# Patient Record
Sex: FEMALE | Race: WHITE | Employment: FULL TIME | URBAN - METROPOLITAN AREA
[De-identification: names, ages, dates, MRNs, and addresses within clinical notes are randomized per-mention and may not be internally consistent; named-entity substitution may affect disease eponyms.]

---

## 2021-05-17 RX ORDER — CEFAZOLIN SODIUM/WATER 2 G/20 ML
2 SYRINGE (ML) INTRAVENOUS ONCE
Status: CANCELLED | OUTPATIENT
Start: 2021-05-17 | End: 2021-05-17

## 2021-05-19 ENCOUNTER — ANESTHESIA EVENT (OUTPATIENT)
Dept: SURGERY | Age: 51
End: 2021-05-19
Payer: COMMERCIAL

## 2021-05-20 ENCOUNTER — HOSPITAL ENCOUNTER (OUTPATIENT)
Age: 51
Setting detail: OUTPATIENT SURGERY
Discharge: HOME | End: 2021-05-20
Attending: ORTHOPAEDIC SURGERY | Admitting: ORTHOPAEDIC SURGERY
Payer: COMMERCIAL

## 2021-05-20 ENCOUNTER — ANESTHESIA (OUTPATIENT)
Dept: SURGERY | Age: 51
End: 2021-05-20
Payer: COMMERCIAL

## 2021-05-20 VITALS
RESPIRATION RATE: 18 BRPM | TEMPERATURE: 98.2 F | WEIGHT: 288 LBS | SYSTOLIC BLOOD PRESSURE: 109 MMHG | HEART RATE: 58 BPM | BODY MASS INDEX: 45.11 KG/M2 | DIASTOLIC BLOOD PRESSURE: 62 MMHG | OXYGEN SATURATION: 96 %

## 2021-05-20 DIAGNOSIS — G89.18 ACUTE POST-OPERATIVE PAIN: ICD-10-CM

## 2021-05-20 DIAGNOSIS — M72.2 PLANTAR FASCIITIS OF LEFT FOOT: Primary | ICD-10-CM

## 2021-05-20 LAB — POTASSIUM BLD-SCNC: 3.7 MMOL/L (ref 3.5–5.1)

## 2021-05-20 PROCEDURE — 76210000020 HC REC RM PH II FIRST 0.5 HR: Performed by: ORTHOPAEDIC SURGERY

## 2021-05-20 PROCEDURE — 77030002982 HC SUT POLYSRB J&J -A: Performed by: ORTHOPAEDIC SURGERY

## 2021-05-20 PROCEDURE — 74011250636 HC RX REV CODE- 250/636: Performed by: ANESTHESIOLOGY

## 2021-05-20 PROCEDURE — 74011000250 HC RX REV CODE- 250: Performed by: NURSE ANESTHETIST, CERTIFIED REGISTERED

## 2021-05-20 PROCEDURE — 84132 ASSAY OF SERUM POTASSIUM: CPT

## 2021-05-20 PROCEDURE — 74011250636 HC RX REV CODE- 250/636

## 2021-05-20 PROCEDURE — 77030037088 HC TUBE ENDOTRACH ORAL NSL COVD-A: Performed by: ANESTHESIOLOGY

## 2021-05-20 PROCEDURE — 77030025281 HC SPLNT ORTHGLS 1 BSNM -B: Performed by: ORTHOPAEDIC SURGERY

## 2021-05-20 PROCEDURE — 28120 PART REMOVAL OF ANKLE/HEEL: CPT | Performed by: ORTHOPAEDIC SURGERY

## 2021-05-20 PROCEDURE — 77030002933 HC SUT MCRYL J&J -A: Performed by: ORTHOPAEDIC SURGERY

## 2021-05-20 PROCEDURE — 77030003602 HC NDL NRV BLK BBMI -B: Performed by: ANESTHESIOLOGY

## 2021-05-20 PROCEDURE — 77030013921: Performed by: ORTHOPAEDIC SURGERY

## 2021-05-20 PROCEDURE — 28060 PARTIAL REMOVAL FOOT FASCIA: CPT | Performed by: ORTHOPAEDIC SURGERY

## 2021-05-20 PROCEDURE — 74011250636 HC RX REV CODE- 250/636: Performed by: ORTHOPAEDIC SURGERY

## 2021-05-20 PROCEDURE — 77030036649: Performed by: ORTHOPAEDIC SURGERY

## 2021-05-20 PROCEDURE — 76010000161 HC OR TIME 1 TO 1.5 HR INTENSV-TIER 1: Performed by: ORTHOPAEDIC SURGERY

## 2021-05-20 PROCEDURE — 2709999900 HC NON-CHARGEABLE SUPPLY: Performed by: ORTHOPAEDIC SURGERY

## 2021-05-20 PROCEDURE — 77030039425 HC BLD LARYNG TRULITE DISP TELE -A: Performed by: ANESTHESIOLOGY

## 2021-05-20 PROCEDURE — 76942 ECHO GUIDE FOR BIOPSY: CPT | Performed by: ORTHOPAEDIC SURGERY

## 2021-05-20 PROCEDURE — C1713 ANCHOR/SCREW BN/BN,TIS/BN: HCPCS | Performed by: ORTHOPAEDIC SURGERY

## 2021-05-20 PROCEDURE — 76010010054 HC POST OP PAIN BLOCK: Performed by: ORTHOPAEDIC SURGERY

## 2021-05-20 PROCEDURE — 76210000063 HC OR PH I REC FIRST 0.5 HR: Performed by: ORTHOPAEDIC SURGERY

## 2021-05-20 PROCEDURE — 27691 REVISE LOWER LEG TENDON: CPT | Performed by: ORTHOPAEDIC SURGERY

## 2021-05-20 PROCEDURE — 76060000033 HC ANESTHESIA 1 TO 1.5 HR: Performed by: ORTHOPAEDIC SURGERY

## 2021-05-20 PROCEDURE — 77030000032 HC CUF TRNQT ZIMM -B: Performed by: ORTHOPAEDIC SURGERY

## 2021-05-20 PROCEDURE — 77030020274 HC MISC IMPL ORTHOPEDIC: Performed by: ORTHOPAEDIC SURGERY

## 2021-05-20 PROCEDURE — 28119 REMOVAL OF HEEL SPUR: CPT | Performed by: ORTHOPAEDIC SURGERY

## 2021-05-20 PROCEDURE — 77030013789 HC KT REP BIO TEND ARTH -C: Performed by: ORTHOPAEDIC SURGERY

## 2021-05-20 PROCEDURE — 27654 REPAIR OF ACHILLES TENDON: CPT | Performed by: ORTHOPAEDIC SURGERY

## 2021-05-20 PROCEDURE — 76010010054 HC POST OP PAIN BLOCK

## 2021-05-20 PROCEDURE — 74011250636 HC RX REV CODE- 250/636: Performed by: NURSE ANESTHETIST, CERTIFIED REGISTERED

## 2021-05-20 DEVICE — TWINFIX ULTRA 4.5 MM POLY L LACTIC                                    ACID-HA SUTURE ANCHOR WITH TWO NO.2                                    ULTRABRAID SUTURES BLUE,                                    BLUE-COBRAID WITH NEEDLES
Type: IMPLANTABLE DEVICE | Site: FOOT | Status: FUNCTIONAL
Brand: TWINFIX

## 2021-05-20 DEVICE — SCREW INTFR L15MM DIA6.25MM BIOCOMPOSITE FACILITATE IORT: Type: IMPLANTABLE DEVICE | Site: FOOT | Status: FUNCTIONAL

## 2021-05-20 DEVICE — HEALICOIL KNOTLESS PK  NST
Type: IMPLANTABLE DEVICE | Site: FOOT | Status: FUNCTIONAL
Brand: HEALICOIL

## 2021-05-20 RX ORDER — SODIUM CHLORIDE, SODIUM LACTATE, POTASSIUM CHLORIDE, CALCIUM CHLORIDE 600; 310; 30; 20 MG/100ML; MG/100ML; MG/100ML; MG/100ML
75 INJECTION, SOLUTION INTRAVENOUS CONTINUOUS
Status: DISCONTINUED | OUTPATIENT
Start: 2021-05-20 | End: 2021-05-20 | Stop reason: HOSPADM

## 2021-05-20 RX ORDER — FENTANYL CITRATE 50 UG/ML
100 INJECTION, SOLUTION INTRAMUSCULAR; INTRAVENOUS ONCE
Status: COMPLETED | OUTPATIENT
Start: 2021-05-20 | End: 2021-05-20

## 2021-05-20 RX ORDER — MIDAZOLAM HYDROCHLORIDE 1 MG/ML
4 INJECTION, SOLUTION INTRAMUSCULAR; INTRAVENOUS ONCE
Status: COMPLETED | OUTPATIENT
Start: 2021-05-20 | End: 2021-05-20

## 2021-05-20 RX ORDER — ROCURONIUM BROMIDE 10 MG/ML
INJECTION, SOLUTION INTRAVENOUS AS NEEDED
Status: DISCONTINUED | OUTPATIENT
Start: 2021-05-20 | End: 2021-05-20 | Stop reason: HOSPADM

## 2021-05-20 RX ORDER — DEXAMETHASONE SODIUM PHOSPHATE 4 MG/ML
INJECTION, SOLUTION INTRA-ARTICULAR; INTRALESIONAL; INTRAMUSCULAR; INTRAVENOUS; SOFT TISSUE AS NEEDED
Status: DISCONTINUED | OUTPATIENT
Start: 2021-05-20 | End: 2021-05-20 | Stop reason: HOSPADM

## 2021-05-20 RX ORDER — SODIUM CHLORIDE 0.9 % (FLUSH) 0.9 %
5-40 SYRINGE (ML) INJECTION AS NEEDED
Status: DISCONTINUED | OUTPATIENT
Start: 2021-05-20 | End: 2021-05-20 | Stop reason: HOSPADM

## 2021-05-20 RX ORDER — HYDROMORPHONE HYDROCHLORIDE 1 MG/ML
0.5 INJECTION, SOLUTION INTRAMUSCULAR; INTRAVENOUS; SUBCUTANEOUS
Status: DISCONTINUED | OUTPATIENT
Start: 2021-05-20 | End: 2021-05-20 | Stop reason: HOSPADM

## 2021-05-20 RX ORDER — DEXAMETHASONE SODIUM PHOSPHATE 4 MG/ML
INJECTION, SOLUTION INTRA-ARTICULAR; INTRALESIONAL; INTRAMUSCULAR; INTRAVENOUS; SOFT TISSUE
Status: COMPLETED | OUTPATIENT
Start: 2021-05-20 | End: 2021-05-20

## 2021-05-20 RX ORDER — CEPHALEXIN 500 MG/1
500 CAPSULE ORAL 4 TIMES DAILY
Qty: 12 CAPSULE | Refills: 0 | Status: SHIPPED | OUTPATIENT
Start: 2021-05-20

## 2021-05-20 RX ORDER — LIDOCAINE HYDROCHLORIDE 20 MG/ML
INJECTION, SOLUTION EPIDURAL; INFILTRATION; INTRACAUDAL; PERINEURAL AS NEEDED
Status: DISCONTINUED | OUTPATIENT
Start: 2021-05-20 | End: 2021-05-20 | Stop reason: HOSPADM

## 2021-05-20 RX ORDER — MIDAZOLAM HYDROCHLORIDE 1 MG/ML
2 INJECTION, SOLUTION INTRAMUSCULAR; INTRAVENOUS
Status: DISCONTINUED | OUTPATIENT
Start: 2021-05-20 | End: 2021-05-20 | Stop reason: HOSPADM

## 2021-05-20 RX ORDER — OXYCODONE HYDROCHLORIDE 5 MG/1
5 TABLET ORAL
Qty: 20 TABLET | Refills: 0 | Status: SHIPPED | OUTPATIENT
Start: 2021-05-20 | End: 2021-05-25

## 2021-05-20 RX ORDER — ONDANSETRON 8 MG/1
8 TABLET, ORALLY DISINTEGRATING ORAL
Status: DISCONTINUED | OUTPATIENT
Start: 2021-05-20 | End: 2021-05-20 | Stop reason: HOSPADM

## 2021-05-20 RX ORDER — LIDOCAINE HYDROCHLORIDE 10 MG/ML
0.1 INJECTION INFILTRATION; PERINEURAL AS NEEDED
Status: DISCONTINUED | OUTPATIENT
Start: 2021-05-20 | End: 2021-05-20 | Stop reason: HOSPADM

## 2021-05-20 RX ORDER — LIDOCAINE HYDROCHLORIDE 20 MG/ML
INJECTION, SOLUTION EPIDURAL; INFILTRATION; INTRACAUDAL; PERINEURAL AS NEEDED
Status: DISCONTINUED | OUTPATIENT
Start: 2021-05-20 | End: 2021-05-20

## 2021-05-20 RX ORDER — PROPOFOL 10 MG/ML
INJECTION, EMULSION INTRAVENOUS AS NEEDED
Status: DISCONTINUED | OUTPATIENT
Start: 2021-05-20 | End: 2021-05-20 | Stop reason: HOSPADM

## 2021-05-20 RX ORDER — ROCURONIUM BROMIDE 10 MG/ML
INJECTION, SOLUTION INTRAVENOUS AS NEEDED
Status: DISCONTINUED | OUTPATIENT
Start: 2021-05-20 | End: 2021-05-20

## 2021-05-20 RX ORDER — HYDROCODONE BITARTRATE AND ACETAMINOPHEN 5; 325 MG/1; MG/1
2 TABLET ORAL AS NEEDED
Status: DISCONTINUED | OUTPATIENT
Start: 2021-05-20 | End: 2021-05-20 | Stop reason: HOSPADM

## 2021-05-20 RX ORDER — ONDANSETRON 2 MG/ML
INJECTION INTRAMUSCULAR; INTRAVENOUS
Status: COMPLETED
Start: 2021-05-20 | End: 2021-05-20

## 2021-05-20 RX ORDER — OXYCODONE HYDROCHLORIDE 5 MG/1
5 TABLET ORAL
Status: DISCONTINUED | OUTPATIENT
Start: 2021-05-20 | End: 2021-05-20 | Stop reason: HOSPADM

## 2021-05-20 RX ORDER — PROPOFOL 10 MG/ML
INJECTION, EMULSION INTRAVENOUS AS NEEDED
Status: DISCONTINUED | OUTPATIENT
Start: 2021-05-20 | End: 2021-05-20

## 2021-05-20 RX ORDER — ONDANSETRON 2 MG/ML
INJECTION INTRAMUSCULAR; INTRAVENOUS AS NEEDED
Status: DISCONTINUED | OUTPATIENT
Start: 2021-05-20 | End: 2021-05-20 | Stop reason: HOSPADM

## 2021-05-20 RX ORDER — SODIUM CHLORIDE 0.9 % (FLUSH) 0.9 %
5-40 SYRINGE (ML) INJECTION EVERY 8 HOURS
Status: DISCONTINUED | OUTPATIENT
Start: 2021-05-20 | End: 2021-05-20 | Stop reason: HOSPADM

## 2021-05-20 RX ADMIN — DEXAMETHASONE SODIUM PHOSPHATE 4 MG: 4 INJECTION, SOLUTION INTRAMUSCULAR; INTRAVENOUS at 08:12

## 2021-05-20 RX ADMIN — LIDOCAINE HYDROCHLORIDE 100 MG: 20 INJECTION, SOLUTION EPIDURAL; INFILTRATION; INTRACAUDAL; PERINEURAL at 09:24

## 2021-05-20 RX ADMIN — ROPIVACAINE HYDROCHLORIDE 40 ML: 5 INJECTION, SOLUTION EPIDURAL; INFILTRATION; PERINEURAL at 08:08

## 2021-05-20 RX ADMIN — ONDANSETRON 4 MG: 2 INJECTION INTRAMUSCULAR; INTRAVENOUS at 12:00

## 2021-05-20 RX ADMIN — ROPIVACAINE HYDROCHLORIDE 30 ML: 5 INJECTION, SOLUTION EPIDURAL; INFILTRATION; PERINEURAL at 08:12

## 2021-05-20 RX ADMIN — SUGAMMADEX 200 MG: 100 INJECTION, SOLUTION INTRAVENOUS at 10:37

## 2021-05-20 RX ADMIN — CEFAZOLIN 3 G: 1 INJECTION, POWDER, FOR SOLUTION INTRAVENOUS at 09:28

## 2021-05-20 RX ADMIN — SODIUM CHLORIDE, SODIUM LACTATE, POTASSIUM CHLORIDE, AND CALCIUM CHLORIDE 75 ML/HR: 600; 310; 30; 20 INJECTION, SOLUTION INTRAVENOUS at 07:59

## 2021-05-20 RX ADMIN — PROPOFOL 200 MG: 10 INJECTION, EMULSION INTRAVENOUS at 09:24

## 2021-05-20 RX ADMIN — ONDANSETRON 4 MG: 2 INJECTION INTRAMUSCULAR; INTRAVENOUS at 10:30

## 2021-05-20 RX ADMIN — ROCURONIUM BROMIDE 40 MG: 10 INJECTION, SOLUTION INTRAVENOUS at 09:24

## 2021-05-20 RX ADMIN — DEXAMETHASONE SODIUM PHOSPHATE 4 MG: 4 INJECTION, SOLUTION INTRAMUSCULAR; INTRAVENOUS at 09:46

## 2021-05-20 RX ADMIN — MIDAZOLAM 3 MG: 1 INJECTION INTRAMUSCULAR; INTRAVENOUS at 08:06

## 2021-05-20 RX ADMIN — DEXAMETHASONE SODIUM PHOSPHATE 4 MG: 4 INJECTION, SOLUTION INTRAMUSCULAR; INTRAVENOUS at 08:08

## 2021-05-20 RX ADMIN — FENTANYL CITRATE 50 MCG: 50 INJECTION, SOLUTION INTRAMUSCULAR; INTRAVENOUS at 08:06

## 2021-05-20 NOTE — OP NOTES
FULL OP NOTE    PATIENT NAME: Noe Arreola  MRN: [id number]    DATE OF SURGERY: 5/20/2021    PREOPERATIVE DIAGNOSIS: Left ankle pain, unspecified chronicity [M25.572]  Plantar fasciitis, left [M72.2]  Achilles tendinitis of left lower extremity [M76.62]      POSTOPERATIVE DIAGNOSIS: Left ankle pain, unspecified chronicity [M25.572]  Plantar fasciitis, left [M72.2]  Achilles tendinitis of left lower extremity [M76.62]      PROCEDURE: 1. Left secondary  repair of Achilles tendon with debridement, 88851                            2.  Left flexor hallucis longus tendon transfer, 45095                            3.  Left Dion's deformity excision of the calcaneus, 48227                            4.  Left plantar fasciotomy using ultrasound guidance to cut and remove the patient's pathologic fascia, 96478, 44771                            5.  Left plantar heel spur exostectomy, 50344        SURGEON: Luis A Wheatley MD    ASSISTANT: Eligio Antonio NP  An assistant was required for positioning, retraction, and wound closure for this procedure. HARDWARE:   Implant Name Type Inv. Item Serial No.  Lot No. LRB No. Used Action   ANCHOR SUTURE 5.0 MM [employer] - [id number]  ANCHOR SUTURE 5.0 MM KNOTLESS HEALICOIL  [employer]WD [id number] Left 1 Implanted   ANCHOR SUT 2 DIA2.5MM KELY PLLA NONABSORBABLE ULTBRAID [facility] - [id number]  ANCHOR SUT 2 DIA2.5MM KELY PLLA NONABSORBABLE ULTBRAID [facility]  [employer]WD [id number] Left 1 Implanted   ANCHOR SUT 2 DIA2.5MM KELY PLLA NONABSORBABLE ULTBRAID DBL - [id number]  ANCHOR SUT 2 DIA2.5MM KELY PLLA NONABSORBABLE ULTBRAID [facility]  [employer]WD [id number] Left 1 Implanted   ANCHOR SUTURE 5.0 MM K[employer]LESS HE[employer] - [id number]  ANCHOR SUTURE 5.0 MM KNOTLESS HEALICOIL  [employer]WD [id number] Left 1 Implanted   SCREW INTFR L15MM DIA6. 25MM BIOCOMPOSITE FACILITATE IORT - [id number]  SCREW INTFR L15MM DIA6. 25MM BIOCOMPOSITE FACILITATE IORT  [employer]WD [id number] Left 1 Implanted     INDICATIONS: This patient is a 52y.o. year old female with a history of Left ankle pain, unspecified chronicity [M25.572]  Plantar fasciitis, left [M72.2]  Achilles tendinitis of left lower extremity [M76.62] who has failed conservative therapy and desires surgical treatment. Risks and benefits of the procedure including, but not limited to, anesthetic complications as well as surgical complications including damage to nerves and blood vessels, risk of infection, risk of incomplete pain relief, risk of malunion, nonunion and need for additional surgery have been discussed with the patient who wishes to proceed. PROCEDURE IN DETAIL: A time out was done to confirm the operating procedure, surgeon, patient and site. A block was placed by the department of anesthesia. During a preop surgical timeout the left lower extremity was identified as a correct surgical site and prepped draped in a standard sterile fashions and ChloraPrep solution. A direct posterior approach to the heel was then performed. A central splitting approach of the Achilles then performed at that time. Approximately 50% of the distal Achilles was debrided due to severe tendinosis. The Dion's deformity was then excised using an osteotome and a power rasp. The deep fascia leg was then incised. The FHL tendon was identified the fibro-osseous tunnel and then harvested taking care to protect the neurovascular bundle. The FHL tendon sheath was then brought down a drill hole in the calcaneus and secured using interference screw. A suture bridge system was used to reattach the Achilles back to the calcaneus without difficulty. The wound was then irrigated and closed using Vicryl and the tendon followed by Monocryl nylon sutures on the skin. A sterile sleeve was placed over the ultrasound transducer.   The anatomy was identified and the diseased fascia was visualized near the insertion the plantar fascia. #11 blade was used to incise through the skin wheal about 1.5 cm distal to the site of maximal tenderness. The blade continued the subcutaneous tissue to incise the fascia. Dissection a plantar fascial surgical instrument was introduced to the incision advancing to the diseased fascia. Once the tip of the instrument was confirmed to be the pathologic tissue, the foot pedal was depressed and the fascia was incised along its length cutting and removing the diseased fascial tissue. Attention was then turned to the plantar heel spur. Under ultrasonic guidance the tip of the instrument is placed on the plantar heel spur which which was excised using ultrasonic energy. A Steri-Strip was then placed. A sterile dressing was then applied followed by well-padded posterior splint. Anesthesia was discontinued. The patient was transferred back to recovery bed. She was taken to recovery in satisfactory condition. She appeared to tolerate the procedure well. There were no apparent surgical or anesthetic complications. All needle and sponge counts are correct. Postoperatively should be nonweightbearing in a splint taking aspirin 325 mg daily for DVT prophylaxis. TOURNIQUET TIME: Approx 42 minutes. SPECIMENS: none    ESTIMATED BLOOD LOSS: min mL.

## 2021-05-20 NOTE — ANESTHESIA POSTPROCEDURE EVALUATION
Procedure(s):  LEFT ACHILLES SECONDARY RECONSTRUCTION/ REPAIR WITH FLEXOR HALLUCIS LONGUS TRANSFER  HAGLUNDS EXCISION  LEFT PLANTAR FASCIA RELEASE AND SPUR EXOSTECTOMY/ CHOICE/ PRONE.    general    Anesthesia Post Evaluation      Multimodal analgesia: multimodal analgesia used between 6 hours prior to anesthesia start to PACU discharge  Patient location during evaluation: PACU  Patient participation: complete - patient participated  Level of consciousness: awake and alert  Pain score: 0  Pain management: adequate  Airway patency: patent  Anesthetic complications: no  Cardiovascular status: acceptable and hemodynamically stable  Respiratory status: acceptable and spontaneous ventilation  Hydration status: acceptable  Post anesthesia nausea and vomiting:  none  Final Post Anesthesia Temperature Assessment:  Normothermia (36.0-37.5 degrees C)      INITIAL Post-op Vital signs:   Vitals Value Taken Time   /62 05/20/21 1120   Temp 36.8 °C (98.2 °F) 05/20/21 1120   Pulse 61 05/20/21 1125   Resp 18 05/20/21 1120   SpO2 98 % 05/20/21 1125   Vitals shown include unvalidated device data.

## 2021-05-20 NOTE — ANESTHESIA PROCEDURE NOTES
Peripheral Block    Start time: 5/20/2021 8:10 AM  End time: 5/20/2021 8:13 AM  Performed by: Yayo Bai MD  Authorized by: Yayo Bai MD       Pre-procedure: Indications: at surgeon's request, post-op pain management and procedure for pain    Preanesthetic Checklist: patient identified, risks and benefits discussed, site marked, timeout performed, anesthesia consent given and patient being monitored    Timeout Time: 08:09 EDT          Block Type:   Block Type: Adductor canal  Laterality:  Left  Monitoring:  Standard ASA monitoring, responsive to questions, continuous pulse ox, oxygen, frequent vital sign checks and heart rate  Injection Technique:  Single shot  Procedures: ultrasound guided    Patient Position: supine  Prep: chlorhexidine    Location:  Mid thigh  Needle Type:  Stimuplex  Needle Gauge:  22 G  Needle Localization:  Ultrasound guidance  Medication Injected:  Dexamethasone (DECADRON) 4 mg/mL injection, 4 mg  ropivacaine 0.5% with epinephrine 1:200,000 injection, 30 mL (Mixture components: EPINEPHrine HCl (PF) 1 mg/mL (1 mL) Soln, . 005 mL; ropivacaine (PF) 5 mg/mL (0.5 %) Soln, 1 mL)  Med Admin Time: 5/20/2021 8:12 AM    Assessment:  Number of attempts:  1  Injection Assessment:  Incremental injection every 5 mL, no paresthesia, ultrasound image on chart, local visualized surrounding nerve on ultrasound, negative aspiration for blood and no intravascular symptoms  Patient tolerance:  Patient tolerated the procedure well with no immediate complications

## 2021-05-20 NOTE — ANESTHESIA PREPROCEDURE EVALUATION
Relevant Problems   No relevant active problems       Anesthetic History     PONV          Review of Systems / Medical History  Patient summary reviewed and pertinent labs reviewed    Pulmonary        Sleep apnea: No treatment           Neuro/Psych   Within defined limits           Cardiovascular    Hypertension: well controlled              Exercise tolerance: >4 METS     GI/Hepatic/Renal  Within defined limits              Endo/Other        Morbid obesity and arthritis     Other Findings              Physical Exam    Airway  Mallampati: II  TM Distance: 4 - 6 cm  Neck ROM: normal range of motion   Mouth opening: Normal     Cardiovascular  Regular rate and rhythm,  S1 and S2 normal,  no murmur, click, rub, or gallop             Dental         Pulmonary  Breath sounds clear to auscultation               Abdominal         Other Findings            Anesthetic Plan    ASA: 3  Anesthesia type: general      Post-op pain plan if not by surgeon: peripheral nerve block single    Induction: Intravenous  Anesthetic plan and risks discussed with: Patient and Spouse

## 2021-05-20 NOTE — H&P
Outpatient Surgery History and Physical:  Byron Allen was seen and examined. CHIEF COMPLAINT:   Left foot    PE:     Visit Vitals  /71 (BP 1 Location: Left arm, BP Patient Position: Supine)   Pulse 73   Temp 98.5 °F (36.9 °C)   Resp 18   Wt 288 lb (130.6 kg)   SpO2 99%   BMI 45.11 kg/m²       Heart:   Regular rhythm      Lungs:  Are clear      Past Medical History: There are no problems to display for this patient. Surgical History:   Past Surgical History:   Procedure Laterality Date    HX CARPAL TUNNEL RELEASE Left     HX  SECTION      x 2    HX HYSTERECTOMY      HX KNEE ARTHROSCOPY Right     x 2 surgeries    HX LAP CHOLECYSTECTOMY      HX TONSIL AND ADENOIDECTOMY      and sinus        Social History: Patient  reports that she has been smoking. She has never used smokeless tobacco. She reports current alcohol use. She reports that she does not use drugs. Family History: No family history on file. Allergies: Reviewed per EMR  Allergies   Allergen Reactions    Pcn [Penicillins] Hives       Medications:    No current facility-administered medications on file prior to encounter. Current Outpatient Medications on File Prior to Encounter   Medication Sig    atenoloL (TENORMIN) 50 mg tablet Take 50 mg by mouth Every morning.  butalbital-acetaminophen-caffeine (FIORICET, ESGIC) -40 mg per tablet Take 1 Tab by mouth every six (6) hours as needed.  desvenlafaxine succinate (PRISTIQ) 50 mg ER tablet Take 50 mg by mouth daily.  diclofenac EC (VOLTAREN) 75 mg EC tablet Take 75 mg by mouth two (2) times a day. Hold for surgery- 5 days prior- instructed 21    aspirin delayed-release 81 mg tablet Take 81 mg by mouth daily. Hold for surgery- 5 days prior- instructed 21    cyclobenzaprine (FLEXERIL) 10 mg tablet Take 10 mg by mouth three (3) times daily as needed (back pain, sciatica).        The surgery is planned for the       LEFT ACHILLES SECONDARY RECONSTRUCTION/ REPAIR WITH POSS FLEXOR HALLUCIS LONGUS TRANSFER , left haglunds excision, left tenex plantar fascia release and plantar spur exostectomy    History and physical has been reviewed. The patient has been examined. There have been no significant clinical changes since the completion of the originally dated History and Physical.  Patient identified by surgeon; surgical site was confirmed by patient and surgeon. The patient is here today for outpatient surgery. I have examined the patient, no changes are noted in the patient's medical status. Necessity for the procedure/care is still present and the history and physical above is current. See the office notes for the full long term history of the problem. Please see the recent office notes for the musculoskeletal examination.     Signed By: Aguila Fried MD     May 20, 2021 7:08 AM

## 2021-05-20 NOTE — ANESTHESIA PROCEDURE NOTES
Peripheral Block    Start time: 5/20/2021 8:06 AM  End time: 5/20/2021 8:09 AM  Performed by: Luis A Tolbert MD  Authorized by: Luis A Tolbert MD       Pre-procedure: Indications: at surgeon's request, post-op pain management and procedure for pain    Preanesthetic Checklist: patient identified, risks and benefits discussed, site marked, timeout performed, anesthesia consent given and patient being monitored    Timeout Time: 08:05 EDT          Block Type:   Block Type:  Popliteal  Laterality:  Left  Monitoring:  Standard ASA monitoring, responsive to questions, oxygen, continuous pulse ox, frequent vital sign checks and heart rate  Injection Technique:  Single shot  Procedures: ultrasound guided and nerve stimulator    Patient Position: right lateral decubitus  Prep: chlorhexidine    Location:  Lower thigh  Needle Type:  Stimuplex  Needle Gauge:  22 G  Needle Localization:  Nerve stimulator and ultrasound guidance  Motor Response comment:   Motor Response: minimal motor response >0.4 mA   Medication Injected:  Dexamethasone (DECADRON) 4 mg/mL injection, 4 mg  ropivacaine 0.5% with epinephrine 1:200,000 injection, 40 mL (Mixture components: EPINEPHrine HCl (PF) 1 mg/mL (1 mL) Soln, . 005 mL; ropivacaine (PF) 5 mg/mL (0.5 %) Soln, 1 mL)  Med Admin Time: 5/20/2021 8:08 AM    Assessment:  Number of attempts:  1  Injection Assessment:  Incremental injection every 5 mL, no paresthesia, ultrasound image on chart, no intravascular symptoms, negative aspiration for blood and local visualized surrounding nerve on ultrasound  Patient tolerance:  Patient tolerated the procedure well with no immediate complications

## 2021-05-20 NOTE — DISCHARGE INSTRUCTIONS
ACTIVITY  · As tolerated and as directed by your doctor. · Bathe or shower as directed by your doctor. DIET  · Clear liquids until no nausea or vomiting; then light diet for the first day. · Advance to regular diet on second day, unless your doctor orders otherwise. · If nausea and vomiting continues, call your doctor. PAIN  · Take pain medication as directed by your doctor. · Call your doctor if pain is NOT relieved by medication. · DO NOT take aspirin of blood thinners unless directed by your doctor. DRESSING CARE       CALL YOUR DOCTOR IF   · Excessive bleeding that does not stop after holding pressure over the area  · Temperature of 101 degrees F or above  · Excessive redness, swelling or bruising, and/ or green or yellow, smelly discharge from incision    AFTER ANESTHESIA   · For the first 24 hours: DO NOT Drive, Drink alcoholic beverages, or Make important decisions. · Be aware of dizziness following anesthesia and while taking pain medication. APPOINTMENT DATE/ TIME    YOUR DOCTOR'S PHONE NUMBER       DISCHARGE SUMMARY from Nurse    PATIENT INSTRUCTIONS:    After general anesthesia or intravenous sedation, for 24 hours or while taking prescription Narcotics:  · Limit your activities  · Do not drive and operate hazardous machinery  · Do not make important personal or business decisions  · Do  not drink alcoholic beverages  · If you have not urinated within 8 hours after discharge, please contact your surgeon on call. *  Please give a list of your current medications to your Primary Care Provider. *  Please update this list whenever your medications are discontinued, doses are      changed, or new medications (including over-the-counter products) are added. *  Please carry medication information at all times in case of emergency situations.       These are general instructions for a healthy lifestyle:    No smoking/ No tobacco products/ Avoid exposure to second hand smoke    Surgeon General's Warning:  Quitting smoking now greatly reduces serious risk to your health. Obesity, smoking, and sedentary lifestyle greatly increases your risk for illness    A healthy diet, regular physical exercise & weight monitoring are important for maintaining a healthy lifestyle    You may be retaining fluid if you have a history of heart failure or if you experience any of the following symptoms:  Weight gain of 3 pounds or more overnight or 5 pounds in a week, increased swelling in our hands or feet or shortness of breath while lying flat in bed. Please call your doctor as soon as you notice any of these symptoms; do not wait until your next office visit. Recognize signs and symptoms of STROKE:    F-face looks uneven    A-arms unable to move or move unevenly    S-speech slurred or non-existent    T-time-call 911 as soon as signs and symptoms begin-DO NOT go       Back to bed or wait to see if you get better-TIME IS BRAIN. INSTRUCTIONS FOLLOWING FOOT SURGERY    ACTIVITY  Elevate foot (feet) for 48 hours. Use crutches as directed by your doctor. No weight bearing on operative foot. Aspirin 325 mg po everyday    DIET  Clear liquids until no nausea or vomiting; then light diet for the first day. Advance to regular diet on second day, unless your doctor orders otherwise. PAIN  Take pain medications as directed by your doctor. Call your doctor if pain is NOT relieved by medication. DO NOT take aspirin or blood thinners until directed by your doctor. DRESSING CARE      FOLLOW-UP PHONE CALLS  Calls will be made by nursing staff. If you have any problems, call your doctor as needed. CALL YOUR DOCTOR IF YOU HAVE  Excessive bleeding that does not stop after holding mild pressure over the area. Temperature of 101 degrees or above. Redness, excessive swelling or bruising, and/or green or yellow, smelly discharge from incision.   Loss of sensation - cold, white or blue toes.    AFTER ANESTHESIA  For the first 24 hours and while taking narcotics for pain: DO NOT Drive, Drink Alcoholic beverages, or make important Decisions. Be aware of dizziness following anesthesia and while taking pain medication.     OTHER INSTRUCTIONS    APPOINTMENT DATE/TIME_________________________________    Joshua Ray DOCTOR'S PHONE NUMBER__________________________

## 2021-05-20 NOTE — BRIEF OP NOTE
Brief Postoperative Note    Patient: Yaniv Sanchez  YOB: 1970  MRN: [id number]    Date of Procedure: 5/20/2021     Pre-Op Diagnosis: Left ankle pain, unspecified chronicity [M25.572]  Plantar fasciitis, left [M72.2]  Achilles tendinitis of left lower extremity [M76.62]    Post-Op Diagnosis: Same as preoperative diagnosis. Procedure(s):  LEFT ACHILLES SECONDARY RECONSTRUCTION/ REPAIR WITH FLEXOR HALLUCIS LONGUS TRANSFER  HAGLUNDS EXCISION  LEFT PLANTAR FASCIA RELEASE AND SPUR EXOSTECTOMY/ CHOICE/ PRONE    Surgeon(s):  Ana Rosa Milton MD    Surgical Assistant: Nurse Practitioner: Charlee Prader, Hillman Pitts, RN    Anesthesia: General     Estimated Blood Loss (mL): Minimal    Complications: None    Specimens: * No specimens in log *     Implants:   Implant Name Type Inv. Item Serial No.  Lot No. LRB No. Used Action   ANCHOR SUTURE 5.0 MM [employer] - [id number]  ANCHOR SUTURE 5.0 MM [employer]  [employer]WD [id number] Left 1 Implanted   ANCHOR SUT 2 DIA2.5MM KELY PLLA NONABSORBABLE ULTBRAID DBL - [id number]  ANCHOR SUT 2 DIA2.5MM KELY PLLA NONABSORBABLE ULTBRAID DB  [employer]WD [id number] Left 1 Implanted   ANCHOR SUT 2 DIA2.5MM KELY PLLA NONABSORBABLE ULTBRAID DBL - [id number]  ANCHOR SUT 2 DIA2.5MM KELY PLLA NONABSORBABLE ULTBRAID DB  [employer]_WD [id number] Left 1 Implanted   ANCHOR SUTURE 5.0 MM K[employer]LESS HEALICOIL - [id number]  ANCHOR SUTURE 5.0 MM K[employer]LESS HEALICOIL  [employer]WD [id number] Left 1 Implanted   SCREW INTFR L15MM DIA6. 25MM BIOCOMPOSITE FACILITATE IORT - [id number]  SCREW INTFR L15MM DIA6. 25MM BIOCOMPOSITE FACILITATE IORT  [employer]WD [id number] Left 1 Implanted       Drains: * No LDAs found *    Findings:     Electronically Signed by Reno Schwartz MD on 5/20/2021 at 2:20 PM

## (undated) DEVICE — BLADE SURG NO15 S STL STR DISP GLASSVAN

## (undated) DEVICE — STERILE HOOK LOCK LATEX FREE ELASTIC BANDAGE 6INX5YD: Brand: HOOK LOCK™

## (undated) DEVICE — GOWN,REINF,POLY,ECL,PP SLV,XL: Brand: MEDLINE

## (undated) DEVICE — DRAPE,ORTHOMAX,EXTREMITY: Brand: MEDLINE

## (undated) DEVICE — BANDAGE,GAUZE,CONFORMING,4"X75",STRL,LF: Brand: MEDLINE

## (undated) DEVICE — BUTTON SWITCH PENCIL BLADE ELECTRODE, HOLSTER: Brand: EDGE

## (undated) DEVICE — CARDINAL HEALTH FLEXIBLE LIGHT HANDLE COVER: Brand: CARDINAL HEALTH

## (undated) DEVICE — REM POLYHESIVE ADULT PATIENT RETURN ELECTRODE: Brand: VALLEYLAB

## (undated) DEVICE — BNDG,ELSTC,MATRIX,STRL,4"X5YD,LF,HOOK&LP: Brand: MEDLINE

## (undated) DEVICE — SUTURE VCRL SZ 2-0 L27IN ABSRB UD L26MM CT-2 1/2 CIR J269H

## (undated) DEVICE — SOLUTION IV 1000ML 0.9% SOD CHL

## (undated) DEVICE — BANDAGE COBAN 6 IN WND 6INX5YD FOAM

## (undated) DEVICE — BANDAGE,GAUZE,BULKEE II,4.5"X4.1YD,STRL: Brand: MEDLINE

## (undated) DEVICE — DRILL 4.5MM FOR 5.5MM ANCHOR SL-PLUS

## (undated) DEVICE — AMD ANTIMICROBIAL GAUZE SPONGES,12 PLY USP TYPE VII, 0.2% POLYHEXAMETHYLENE BIGUANIDE HCI (PHMB): Brand: CURITY

## (undated) DEVICE — SPLINT CAST W4XL30IN WHT THMB FBRGLS PRECUT INTLOK WRINKLE

## (undated) DEVICE — BANDAGE,ELASTIC,ESMARK,STERILE,4"X9',LF: Brand: MEDLINE

## (undated) DEVICE — SUTURE MCRYL SZ 3-0 L27IN ABSRB UD L19MM PS-2 3/8 CIR PRIM Y427H

## (undated) DEVICE — FOOT & ANKLE SOFT DR WOMACK: Brand: MEDLINE INDUSTRIES, INC.

## (undated) DEVICE — ZIMMER® STERILE DISPOSABLE TOURNIQUET CUFF WITH PLC, DUAL PORT, SINGLE BLADDER, 30 IN. (76 CM)

## (undated) DEVICE — KIT INSTR W/ 2.4MM GUIDEPIN SUT PASS WIRE NO2 FIBERWIRE

## (undated) DEVICE — NON-ADHERING DRESSING: Brand: ADAPTIC®

## (undated) DEVICE — Device: Brand: JELCO

## (undated) DEVICE — PREP SKN CHLRAPRP APL 26ML STR --

## (undated) DEVICE — DRAPE, FILM SHEET, 44X65 STERILE: Brand: MEDLINE

## (undated) DEVICE — 3M™ TEGADERM™ TRANSPARENT FILM DRESSING FRAME STYLE, 1626W, 4 IN X 4-3/4 IN (10 CM X 12 CM), 50/CT 4CT/CASE: Brand: 3M™ TEGADERM™

## (undated) DEVICE — SUTURE MCRYL SZ 3-0 L27IN ABSRB UD L24MM PS-1 3/8 CIR PRIM Y936H

## (undated) DEVICE — PAD,ABDOMINAL,5"X9",ST,LF,25/BX: Brand: MEDLINE INDUSTRIES, INC.

## (undated) DEVICE — GOWN,SIRUS,NONRNF,SETINSLV,XL,20/CS: Brand: MEDLINE

## (undated) DEVICE — LARGE TEAR CROSS CUT RASP (14.0 X 7.0MM)

## (undated) DEVICE — SPONGE GZ W4XL4IN COT 12 PLY TYP VII WVN C FLD DSGN

## (undated) DEVICE — TX2 PROCEDURE PACK: Brand: TENEX HEALTH TX®

## (undated) DEVICE — SYR LR LCK 1ML GRAD NSAF 30ML --

## (undated) DEVICE — PADDING CAST W4INXL4YD ST COT COHESIVE HND TEARABLE SPEC